# Patient Record
Sex: MALE
[De-identification: names, ages, dates, MRNs, and addresses within clinical notes are randomized per-mention and may not be internally consistent; named-entity substitution may affect disease eponyms.]

---

## 2019-12-19 PROBLEM — Z00.00 ENCOUNTER FOR PREVENTIVE HEALTH EXAMINATION: Status: ACTIVE | Noted: 2019-12-19

## 2020-01-03 ENCOUNTER — APPOINTMENT (OUTPATIENT)
Dept: OTOLARYNGOLOGY | Facility: CLINIC | Age: 35
End: 2020-01-03
Payer: COMMERCIAL

## 2020-01-03 VITALS — HEIGHT: 71 IN | BODY MASS INDEX: 33.6 KG/M2 | WEIGHT: 240 LBS

## 2020-01-03 DIAGNOSIS — H65.21 CHRONIC SEROUS OTITIS MEDIA, RIGHT EAR: ICD-10-CM

## 2020-01-03 DIAGNOSIS — H90.41 SENSORINEURAL HEARING LOSS, UNILATERAL, RIGHT EAR, WITH UNRESTRICTED HEARING ON THE CONTRALATERAL SIDE: ICD-10-CM

## 2020-01-03 DIAGNOSIS — H69.81 OTHER SPECIFIED DISORDERS OF EUSTACHIAN TUBE, RIGHT EAR: ICD-10-CM

## 2020-01-03 PROCEDURE — 92557 COMPREHENSIVE HEARING TEST: CPT

## 2020-01-03 PROCEDURE — 99204 OFFICE O/P NEW MOD 45 MIN: CPT | Mod: 25

## 2020-01-03 PROCEDURE — 92567 TYMPANOMETRY: CPT

## 2020-01-03 RX ORDER — PREDNISONE 10 MG/1
10 TABLET ORAL TWICE DAILY
Qty: 20 | Refills: 1 | Status: ACTIVE | COMMUNITY
Start: 2020-01-03 | End: 1900-01-01

## 2020-01-03 NOTE — ASSESSMENT
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

## 2020-01-03 NOTE — CONSULT LETTER
[FreeTextEntry2] : tigre erickson md [facility] [FreeTextEntry1] : Dear Dr.  none,\par \par Thank you for your kind referral. Please refer to my enclosed office notes for PAULINA WU . If there are any questions free to contact me.\par

## 2020-01-03 NOTE — REVIEW OF SYSTEMS
[Hearing Loss] : hearing loss [Problem Snoring] : problem snoring [Negative] : Heme/Lymph [Patient Intake Form Reviewed] : Patient intake form was reviewed [FreeTextEntry1] : noisy breathing

## 2020-01-03 NOTE — HISTORY OF PRESENT ILLNESS
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

## 2020-02-03 ENCOUNTER — APPOINTMENT (OUTPATIENT)
Dept: OTOLARYNGOLOGY | Facility: CLINIC | Age: 35
End: 2020-02-03

## 2024-10-20 ENCOUNTER — EMERGENCY (EMERGENCY)
Facility: HOSPITAL | Age: 39
LOS: 0 days | Discharge: HOME | End: 2024-10-20
Attending: STUDENT IN AN ORGANIZED HEALTH CARE EDUCATION/TRAINING PROGRAM
Payer: COMMERCIAL

## 2024-10-20 VITALS
OXYGEN SATURATION: 99 % | HEART RATE: 56 BPM | RESPIRATION RATE: 20 BRPM | WEIGHT: 227.74 LBS | DIASTOLIC BLOOD PRESSURE: 84 MMHG | SYSTOLIC BLOOD PRESSURE: 155 MMHG | TEMPERATURE: 98 F

## 2024-10-20 VITALS — SYSTOLIC BLOOD PRESSURE: 153 MMHG | DIASTOLIC BLOOD PRESSURE: 82 MMHG | HEART RATE: 63 BPM
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
## 2024-10-20 DIAGNOSIS — H57.8A1 FOREIGN BODY SENSATION, RIGHT EYE: ICD-10-CM

## 2024-10-20 DIAGNOSIS — S05.01XA INJURY OF CONJUNCTIVA AND CORNEAL ABRASION WITHOUT FOREIGN BODY, RIGHT EYE, INITIAL ENCOUNTER: ICD-10-CM

## 2024-10-20 PROCEDURE — 99284 EMERGENCY DEPT VISIT MOD MDM: CPT

## 2024-10-20 PROCEDURE — 99283 EMERGENCY DEPT VISIT LOW MDM: CPT

## 2024-10-20 RX ORDER — FLUORESCEIN SODIUM 100 %
1 POWDER (GRAM) MISCELLANEOUS ONCE
Refills: 0 | Status: COMPLETED | OUTPATIENT
Start: 2024-10-20 | End: 2024-10-20

## 2024-10-20 RX ORDER — TETRACAINE HCL 0.5 %
1 DROPS OPHTHALMIC (EYE) ONCE
Refills: 0 | Status: COMPLETED | OUTPATIENT
Start: 2024-10-20 | End: 2024-10-20

## 2024-10-20 RX ADMIN — Medication 1 APPLICATION(S): at 19:49

## 2024-10-20 RX ADMIN — Medication 1 DROP(S): at 19:30

## 2024-10-20 RX ADMIN — Medication 1 APPLICATION(S): at 19:30

## 2024-10-20 NOTE — ED STATDOCS - DISCHARGE DATE
Rx Refill Note  Requested Prescriptions     Pending Prescriptions Disp Refills   • lisinopril (PRINIVIL,ZESTRIL) 20 MG tablet 90 tablet 1     Sig: Take 1 tablet by mouth every night at bedtime.      Last office visit with prescribing clinician: 8/15/2022   Last telemedicine visit with prescribing clinician: 8/15/2022  Next office visit with prescribing clinician: Visit date not found                         Would you like a call back once the refill request has been completed: [] Yes [] No    If the office needs to give you a call back, can they leave a voicemail: [] Yes [] No    Joe Zhu, SAVANA/LMR  02/08/23, 08:09 EST  
20-Oct-2024

## 2024-10-20 NOTE — ED STATDOCS - CLINICAL SUMMARY MEDICAL DECISION MAKING FREE TEXT BOX
Mostly likely corneal abrasion, do not suspect globe penetration. Will do fluorescein exam to r/o Kali sign. Pt's vision is intact with circular pupil reactive to light. Symptoms consistent with corneal abrasion. Given abx by UC.

## 2024-10-20 NOTE — ED STATDOCS - NSFOLLOWUPINSTRUCTIONS_ED_ALL_ED_FT
FOLLOW UP WITH AN OPHTHALMOLOGIST TOMORROW. CALL THE OFFICE TO MAKE AN APPOINTMENT. RETURN TO ER FOR ANY WORSENING SYMPTOMS OR NEW CONCERNS.     Corneal Abrasion     WHAT YOU NEED TO KNOW:    A corneal abrasion is a scratch on the cornea of your eye. The cornea is the clear layer that covers the front of your eye. A small scratch may heal in 1 to 2 days. Deeper or larger scratches may take longer to heal. Eye Anatomy         DISCHARGE INSTRUCTIONS:    Call your healthcare provider or ophthalmologist if:     Your eye pain or vision gets worse.      You have yellow or green drainage from your eye.      You have questions or concerns about your condition or care.    Medicines:     Medicines may be given in the form of eyedrops or ointment to help prevent an eye infection. You may also be given eyedrops to decrease pain.      Take your medicine as directed. Contact your healthcare provider if you think your medicine is not helping or if you have side effects. Tell him or her if you are allergic to any medicine. Keep a list of the medicines, vitamins, and herbs you take. Include the amounts, and when and why you take them. Bring the list or the pill bottles to follow-up visits. Carry your medicine list with you in case of an emergency.    Care for your eyes:     Get regular eye exams. Get your eyes checked at least every year.      Eat healthy foods. Fresh fruits and vegetables that are rich in vitamins A and C may help with your vision. Foods such as sweet potatoes, apricots, and carrots are rich in good nutrients for the eyes.      Take care of your contacts or glasses. Store, clean, and use your contacts or glasses as directed. Replace your glasses or contact lenses as often as your healthcare provider suggests.      Decrease eye strain. Rest your eyes, especially after you read or sew for long periods of time. Get plenty of sleep at night. Use lights that reduce glare in your home, school, or workplace.      Wear dark sunglasses. This will help prevent pain and light sensitivity. Make sure the sunglasses have UVA and UVB protection. This will protect your eyes when you go outside.      Use eyedrops safely. If your treatment plan includes eyedrops, it is important to use them as directed. Your provider may give you detailed instructions to follow. The eyedrops may also come with safety instructions. Follow all instructions to help prevent an infection. Do not touch the tip of the bottle to your eye. Germs from your eye can spread to the medicine bottle.Steps 1 2 3 4         Help prevent corneal abrasions:     Remove your contact lenses if your eyes feel dry or irritated.      Wash your hands if you need to touch your eyes or your face.      Trim your child's fingernails so he or she cannot scratch his or her eye.      Wear protective eyewear when you work with chemicals, wood, dust, or metal.      Wear protective eyewear when you play sports.      Do not wear your contacts for longer than you should.      Do not wear colored lenses or lenses with shapes on them. These lenses may cause eye damage and vision loss.      Do not wear glitter makeup. Glitter can easily get into your eyes and under contact lenses.      Do not sleep with your contacts in.    Follow up with your healthcare provider as directed: Write down your questions so you remember to ask them during your visits.

## 2024-10-20 NOTE — ED STATDOCS - PHYSICAL EXAMINATION
Constitutional: NAD  Eyes: EOMI, PERRL, R eye 20/20, L eye 20/25, lids everted with no foreign body noted, pupil is normal in shape  Head: Normocephalic atraumatic  Mouth: MMM  Cardiac: regular rate   Resp: unlabored breathing  GI: Abd s/nt/nd  Neuro: grossly normal and intact  Skin: No visible rashes

## 2024-10-20 NOTE — ED STATDOCS - PROGRESS NOTE DETAILS
signed Latoya Mcconnell PA-C Pt seen and evaluated initially in intake by Dr. Alberts.   39M with FB sensation right eye after hammering at home last night and felt a piece of metal go into his eye. no glasses or contact use. Pt went to urgent care and was prescribed moxifloxacin gtt but came to ER bc he still has FB sensation. I examined pt with slit lamp with and without fluorescein. No FB seen. +2mm vertical linear corneal abrasion lateral to pupil. neg ania. continue abx gtt, f/u with optho tomorrow. erythromycin at night. return precautions given. Pt feeling well, pt and family agree with DC and plan of care.

## 2024-10-20 NOTE — ED STATDOCS - OBJECTIVE STATEMENT
40 y/o M with no pertinent PMHx presents to the ED c/o foreign object in R eye. States he was using a hammer last night when he felt a piece of metal go into his eye. Went to  today where they flushed his eye and then prescribed antibiotic drops. Pt did not have a slit lamp exam at . Pt states he feels there is something still in his eye and the provider at  aggravated the feeling of discomfort, so he decided to come to the ED. Endorses eye discomfort. Denies vision changes. Pt has been rubbing his eye. Pt does not wear glasses or contacts. Unknown last tetanus. 40 y/o M with no pertinent PMHx presents to the ED c/o foreign object sensation in R eye. States he was using a hammer last night when he felt something go into his eye. Went to  today where they flushed his eye and then prescribed antibiotic drops. Pt states he feels there is something still in his eye and the provider at  aggravated the feeling of discomfort, so he decided to come to the ED. Endorses eye discomfort. Denies vision changes. Pt has been rubbing his eye. Pt does not wear glasses or contacts.

## 2024-10-20 NOTE — ED STATDOCS - CARE PROVIDER_API CALL
Ajit Levine  Ophthalmology  [address] [place] [address], [address]  [place] [address]  Phone: [phone]  Fax: [phone]  Follow Up Time:

## 2024-10-20 NOTE — ED STATDOCS - PATIENT PORTAL LINK FT
You can access the FollowMyHealth Patient Portal offered by [facility] by registering at the following website: [email or web address]. By joining [facility]’s FollowMyHealth portal, you will also be able to view your health information using other applications (apps) compatible with our system.

## 2024-10-20 NOTE — ED ADULT TRIAGE NOTE - CHIEF COMPLAINT QUOTE
Pt presents to er with complaints of having a metal piece go into his right eye, unknown if utd with Tetanus vaccine.